# Patient Record
Sex: MALE | Race: WHITE | NOT HISPANIC OR LATINO | URBAN - METROPOLITAN AREA
[De-identification: names, ages, dates, MRNs, and addresses within clinical notes are randomized per-mention and may not be internally consistent; named-entity substitution may affect disease eponyms.]

---

## 2023-01-29 ENCOUNTER — WEB ENCOUNTER (OUTPATIENT)
Dept: URBAN - METROPOLITAN AREA CLINIC 128 | Facility: CLINIC | Age: 58
End: 2023-01-29

## 2023-02-01 ENCOUNTER — TELEPHONE ENCOUNTER (OUTPATIENT)
Dept: URBAN - METROPOLITAN AREA CLINIC 128 | Facility: CLINIC | Age: 58
End: 2023-02-01

## 2023-02-01 ENCOUNTER — OFFICE VISIT (OUTPATIENT)
Dept: URBAN - METROPOLITAN AREA CLINIC 128 | Facility: CLINIC | Age: 58
End: 2023-02-01
Payer: COMMERCIAL

## 2023-02-01 VITALS
TEMPERATURE: 98.4 F | SYSTOLIC BLOOD PRESSURE: 160 MMHG | HEART RATE: 83 BPM | DIASTOLIC BLOOD PRESSURE: 98 MMHG | BODY MASS INDEX: 28.71 KG/M2 | HEIGHT: 72 IN | WEIGHT: 212 LBS

## 2023-02-01 DIAGNOSIS — R10.9 ABDOMINAL PAIN: ICD-10-CM

## 2023-02-01 DIAGNOSIS — D12.6 TUBULAR ADENOMA OF COLON: ICD-10-CM

## 2023-02-01 DIAGNOSIS — Z83.71 FAMILY HISTORY OF COLONIC POLYPS: ICD-10-CM

## 2023-02-01 DIAGNOSIS — K29.50 CHRONIC GASTRITIS WITHOUT BLEEDING, UNSPECIFIED GASTRITIS TYPE: ICD-10-CM

## 2023-02-01 DIAGNOSIS — K22.70 BARRETT'S ESOPHAGUS WITHOUT DYSPLASIA: ICD-10-CM

## 2023-02-01 PROCEDURE — 99204 OFFICE O/P NEW MOD 45 MIN: CPT | Performed by: PHYSICIAN ASSISTANT

## 2023-02-01 RX ORDER — HYOSCYAMINE SULFATE 0.12 MG/1
1 TABLET UNDER THE TONGUE AND ALLOW TO DISSOLVE  AS NEEDED TABLET, ORALLY DISINTEGRATING ORAL THREE TIMES A DAY
Qty: 30 | Refills: 0 | OUTPATIENT
Start: 2023-02-01 | End: 2023-03-03

## 2023-02-01 RX ORDER — FAMOTIDINE 20 MG/1
1 TABLET AT BEDTIME TABLET, FILM COATED ORAL ONCE A DAY
Qty: 30 TABLET | Refills: 5 | OUTPATIENT
Start: 2023-02-01

## 2023-02-01 NOTE — HPI-TODAY'S VISIT:
The patient is a new patientt  who elicits having abdominal pain. Location: RLQ, the patient believes it is a right "inguinal hernia" Duration of symptoms:x 1 year Associated symptoms: none Severity/ Pain scale: mild What alleviates the symptoms: resting What aggravates the symptoms: heavy lifting Any recent weight changes: none Any recent medication changes: none Any recent dietary changes: none Previous work-up- labs,imaging, scopes: Last colonoscopy: 2018 Last EGD:  never He has known GERD but has not been taking any antacids and is diet controlled Patient denies a family history of colon cancer His father had colon polyps

## 2023-02-01 NOTE — PHYSICAL EXAM GASTROINTESTINAL
Abdomen , soft, nontender, nondistended , no guarding or rigidity , no masses palpable , normal bowel sounds, negative Paige's sign, negative psoas and obturator signs, negative CVA tenderness bilaterally, a right inguinal hernia was palpated Liver and Spleen , no hepatosplenomegaly Rectal deferred

## 2023-02-03 LAB
A/G RATIO: 1.1
ABSOLUTE BASOPHILS: 82
ABSOLUTE EOSINOPHILS: 7
ABSOLUTE LYMPHOCYTES: 632
ABSOLUTE MONOCYTES: 469
ABSOLUTE NEUTROPHILS: 5610
ALBUMIN: 3.5
ALKALINE PHOSPHATASE: 195
ALT (SGPT): 106
AST (SGOT): 180
BASOPHILS: 1.2
BILIRUBIN, TOTAL: 1.9
BUN/CREATININE RATIO: (no result)
BUN: 8
C-REACTIVE PROTEIN, QUANT: 3.1
CALCIUM: 8.2
CARBON DIOXIDE, TOTAL: 27
CHLORIDE: 101
CREATININE: 0.88
EGFR: 102
EOSINOPHILS: 0.1
GLOBULIN, TOTAL: 3.1
GLUCOSE: 106
HEMATOCRIT: 42.4
HEMOGLOBIN: 15.2
IMMUNOGLOBULIN A: 426
INTERPRETATION: (no result)
LIPASE: 13
LYMPHOCYTES: 9.3
MCH: 33.9
MCHC: 35.8
MCV: 94.6
MONOCYTES: 6.9
MPV: 11.5
NEUTROPHILS: 82.5
PLATELET COUNT: 190
POTASSIUM: 3.3
PROTEIN, TOTAL: 6.6
RDW: 14.6
RED BLOOD CELL COUNT: 4.48
SODIUM: 142
TISSUE TRANSGLUTAMINASE AB, IGA: <1
WHITE BLOOD CELL COUNT: 6.8

## 2023-02-22 ENCOUNTER — OFFICE VISIT (OUTPATIENT)
Dept: URBAN - METROPOLITAN AREA CLINIC 128 | Facility: CLINIC | Age: 58
End: 2023-02-22
Payer: COMMERCIAL

## 2023-02-22 VITALS
SYSTOLIC BLOOD PRESSURE: 132 MMHG | DIASTOLIC BLOOD PRESSURE: 95 MMHG | TEMPERATURE: 97.1 F | BODY MASS INDEX: 28.58 KG/M2 | WEIGHT: 211 LBS | HEART RATE: 91 BPM | HEIGHT: 72 IN

## 2023-02-22 DIAGNOSIS — R16.1 SPLENOMEGALY: ICD-10-CM

## 2023-02-22 DIAGNOSIS — K76.0 FATTY LIVER: ICD-10-CM

## 2023-02-22 DIAGNOSIS — R10.9 ABDOMINAL PAIN: ICD-10-CM

## 2023-02-22 DIAGNOSIS — R79.89 ELEVATED LFTS: ICD-10-CM

## 2023-02-22 DIAGNOSIS — K40.90 INGUINAL HERNIA WITHOUT OBSTRUCTION OR GANGRENE, RECURRENCE NOT SPECIFIED, UNSPECIFIED LATERALITY: ICD-10-CM

## 2023-02-22 DIAGNOSIS — K76.6 PORTAL HYPERTENSION: ICD-10-CM

## 2023-02-22 DIAGNOSIS — I85.00 ESOPHAGEAL VARICES WITHOUT BLEEDING, UNSPECIFIED ESOPHAGEAL VARICES TYPE: ICD-10-CM

## 2023-02-22 DIAGNOSIS — D12.6 TUBULAR ADENOMA OF COLON: ICD-10-CM

## 2023-02-22 DIAGNOSIS — K29.50 CHRONIC GASTRITIS WITHOUT BLEEDING, UNSPECIFIED GASTRITIS TYPE: ICD-10-CM

## 2023-02-22 DIAGNOSIS — Z83.71 FAMILY HISTORY OF COLONIC POLYPS: ICD-10-CM

## 2023-02-22 DIAGNOSIS — K22.70 BARRETT'S ESOPHAGUS WITHOUT DYSPLASIA: ICD-10-CM

## 2023-02-22 PROBLEM — 302914006: Status: ACTIVE | Noted: 2023-02-01

## 2023-02-22 PROBLEM — 34742003: Status: ACTIVE | Noted: 2023-02-22

## 2023-02-22 PROBLEM — 197321007: Status: ACTIVE | Noted: 2023-02-22

## 2023-02-22 PROBLEM — 8493009: Status: ACTIVE | Noted: 2023-02-01

## 2023-02-22 PROBLEM — 14223005: Status: ACTIVE | Noted: 2023-02-22

## 2023-02-22 PROCEDURE — 99215 OFFICE O/P EST HI 40 MIN: CPT | Performed by: PHYSICIAN ASSISTANT

## 2023-02-22 RX ORDER — FAMOTIDINE 20 MG/1
1 TABLET AT BEDTIME TABLET, FILM COATED ORAL ONCE A DAY
Qty: 30 TABLET | Refills: 5 | Status: ACTIVE | COMMUNITY
Start: 2023-02-01

## 2023-02-22 RX ORDER — HYOSCYAMINE SULFATE 0.12 MG/1
1 TABLET UNDER THE TONGUE AND ALLOW TO DISSOLVE  AS NEEDED TABLET, ORALLY DISINTEGRATING ORAL THREE TIMES A DAY
Qty: 30 | Refills: 0 | Status: ON HOLD | COMMUNITY
Start: 2023-02-01 | End: 2023-03-03

## 2023-02-22 RX ORDER — FAMOTIDINE 20 MG/1
1 TABLET AT BEDTIME TABLET, FILM COATED ORAL ONCE A DAY
Qty: 30 TABLET | Refills: 5 | OUTPATIENT

## 2023-02-22 NOTE — HPI-TODAY'S VISIT:
The patient is here for a follow up on abdominal pain. Location: RLQ, the patient believes it is a right "inguinal hernia" Duration of symptoms:x 1 year Associated symptoms: none Severity/ Pain scale: mild What alleviates the symptoms: resting What aggravates the symptoms: heavy lifting Any recent weight changes: none Any recent medication changes: none Any recent dietary changes: none Previous work-up- labs,imaging, scopes: Last colonoscopy: 2018 Last EGD:  never He has known GERD but has not been taking any antacids and is diet controlled Patient denies a family history of colon cancer His father had colon polyps His recent labs revealed a total bili 1.9, alk phos 195, .  He was drinking 4 drinks of beer a day but once he got his lab results, he has stopped all ETOH use. [facility] ct scan revealed no acute pricess, small fat containing right inguinal hernia, fatty liver, portal hypertension with small varices that are perisplenic and paraesophageal, splenomgealy, and diverticulosis.  His hopsital ct scan of abdomen and pelvis confirmed a right inguinal hernia and Dr. Todd will be repairing it in 3/3/23.

## 2023-03-02 ENCOUNTER — OFFICE VISIT (OUTPATIENT)
Dept: URBAN - METROPOLITAN AREA SURGERY CENTER 31 | Facility: SURGERY CENTER | Age: 58
End: 2023-03-02

## 2023-03-27 ENCOUNTER — TELEPHONE ENCOUNTER (OUTPATIENT)
Dept: URBAN - METROPOLITAN AREA CLINIC 128 | Facility: CLINIC | Age: 58
End: 2023-03-27

## 2023-03-31 PROBLEM — 429969003: Status: ACTIVE | Noted: 2023-03-31

## 2023-03-31 PROBLEM — 444898006: Status: ACTIVE | Noted: 2023-03-31

## 2023-04-14 ENCOUNTER — OFFICE VISIT (OUTPATIENT)
Dept: URBAN - METROPOLITAN AREA MEDICAL CENTER 18 | Facility: MEDICAL CENTER | Age: 58
End: 2023-04-14
Payer: COMMERCIAL

## 2023-04-14 DIAGNOSIS — K20.80 ESOPHAGITIS DISSECANS SUPERFICIALIS: ICD-10-CM

## 2023-04-14 DIAGNOSIS — R93.3 ABN FINDINGS-GI TRACT: ICD-10-CM

## 2023-04-14 DIAGNOSIS — K31.89 ACQUIRED DEFORMITY OF DUODENUM: ICD-10-CM

## 2023-04-14 PROCEDURE — 43239 EGD BIOPSY SINGLE/MULTIPLE: CPT | Performed by: INTERNAL MEDICINE

## 2023-04-18 ENCOUNTER — P2P PATIENT RECORD (OUTPATIENT)
Age: 58
End: 2023-04-18

## 2023-04-26 ENCOUNTER — OFFICE VISIT (OUTPATIENT)
Dept: URBAN - METROPOLITAN AREA SURGERY CENTER 31 | Facility: SURGERY CENTER | Age: 58
End: 2023-04-26
Payer: COMMERCIAL

## 2023-04-26 DIAGNOSIS — D12.4 ADENOMA OF DESCENDING COLON: ICD-10-CM

## 2023-04-26 DIAGNOSIS — D12.8 ADENOMATOUS POLYP OF RECTUM: ICD-10-CM

## 2023-04-26 DIAGNOSIS — Z86.010 ADENOMAS PERSONAL HISTORY OF COLONIC POLYPS: ICD-10-CM

## 2023-04-26 PROCEDURE — 45385 COLONOSCOPY W/LESION REMOVAL: CPT | Performed by: INTERNAL MEDICINE

## 2023-04-26 PROCEDURE — G8907 PT DOC NO EVENTS ON DISCHARG: HCPCS | Performed by: INTERNAL MEDICINE

## 2023-05-23 LAB
A/G RATIO: 1.2
ACTIN (SMOOTH MUSCLE) ANTIBODY (IGG): 25
AFP, SERUM, TUMOR MARKER: 5
ALBUMIN: 4.1
ALKALINE PHOSPHATASE: 101
ALKALINE PHOSPHATASE: 116
ALPHA-1-ANTITRYPSIN QN: 131
ALT (SGPT): 24
ANA SCREEN, IFA: NEGATIVE
AST (SGOT): 46
BILIRUBIN, TOTAL: 0.8
BONE ISOENZYMES: 23
BUN/CREATININE RATIO: (no result)
BUN: 14
CALCIUM: 9.5
CARBON DIOXIDE, TOTAL: 25
CERULOPLASMIN: 25
CHLORIDE: 106
CREATININE: 1.03
EGFR: 86
FERRITIN, SERUM: 114
GLOBULIN, TOTAL: 3.5
GLUCOSE: 95
HBSAG SCREEN: (no result)
HEP A AB, IGM: (no result)
HEP B CORE AB, IGM: (no result)
HEP C VIRUS AB: 0.1
HEPATITIS C ANTIBODY: (no result)
INR: 1.1
INTESTINAL ISOENZYMES: 7
IRON BIND.CAP.(TIBC): 397
IRON SATURATION: 28
IRON: 111
LIVER ISOENZYMES: 69
MACROHEPATIC ISOENZYMES: 0
MITOCHONDRIAL (M2) ANTIBODY: <=20
PLACENTAL ISOENZYMES: 0
POTASSIUM: 4.4
PROTEIN, TOTAL: 7.6
PT: 11.4
SODIUM: 140

## 2023-05-24 ENCOUNTER — DASHBOARD ENCOUNTERS (OUTPATIENT)
Age: 58
End: 2023-05-24

## 2023-05-24 ENCOUNTER — TELEPHONE ENCOUNTER (OUTPATIENT)
Dept: URBAN - METROPOLITAN AREA CLINIC 19 | Facility: CLINIC | Age: 58
End: 2023-05-24

## 2023-05-24 ENCOUNTER — OFFICE VISIT (OUTPATIENT)
Dept: URBAN - METROPOLITAN AREA CLINIC 128 | Facility: CLINIC | Age: 58
End: 2023-05-24
Payer: COMMERCIAL

## 2023-05-24 ENCOUNTER — WEB ENCOUNTER (OUTPATIENT)
Dept: URBAN - METROPOLITAN AREA CLINIC 128 | Facility: CLINIC | Age: 58
End: 2023-05-24

## 2023-05-24 VITALS
BODY MASS INDEX: 32.43 KG/M2 | HEART RATE: 72 BPM | HEIGHT: 72 IN | DIASTOLIC BLOOD PRESSURE: 68 MMHG | SYSTOLIC BLOOD PRESSURE: 120 MMHG | TEMPERATURE: 97.3 F | WEIGHT: 239.4 LBS

## 2023-05-24 DIAGNOSIS — R16.1 SPLENOMEGALY: ICD-10-CM

## 2023-05-24 DIAGNOSIS — K40.90 INGUINAL HERNIA WITHOUT OBSTRUCTION OR GANGRENE, RECURRENCE NOT SPECIFIED, UNSPECIFIED LATERALITY: ICD-10-CM

## 2023-05-24 DIAGNOSIS — R10.9 ABDOMINAL PAIN: ICD-10-CM

## 2023-05-24 DIAGNOSIS — K76.0 FATTY LIVER: ICD-10-CM

## 2023-05-24 DIAGNOSIS — K76.6 PORTAL HYPERTENSION: ICD-10-CM

## 2023-05-24 DIAGNOSIS — K29.50 CHRONIC GASTRITIS WITHOUT BLEEDING, UNSPECIFIED GASTRITIS TYPE: ICD-10-CM

## 2023-05-24 DIAGNOSIS — D12.6 TUBULAR ADENOMA OF COLON: ICD-10-CM

## 2023-05-24 DIAGNOSIS — K22.70 BARRETT'S ESOPHAGUS WITHOUT DYSPLASIA: ICD-10-CM

## 2023-05-24 DIAGNOSIS — R79.89 ELEVATED LFTS: ICD-10-CM

## 2023-05-24 DIAGNOSIS — K20.90 ESOPHAGITIS: ICD-10-CM

## 2023-05-24 DIAGNOSIS — Z83.71 FAMILY HISTORY OF COLONIC POLYPS: ICD-10-CM

## 2023-05-24 DIAGNOSIS — I85.00 ESOPHAGEAL VARICES WITHOUT BLEEDING, UNSPECIFIED ESOPHAGEAL VARICES TYPE: ICD-10-CM

## 2023-05-24 PROCEDURE — 99214 OFFICE O/P EST MOD 30 MIN: CPT | Performed by: PHYSICIAN ASSISTANT

## 2023-05-24 RX ORDER — FAMOTIDINE 20 MG/1
1 TABLET AT BEDTIME TABLET, FILM COATED ORAL ONCE A DAY
Qty: 30 TABLET | Refills: 5 | OUTPATIENT

## 2023-05-24 RX ORDER — FAMOTIDINE 20 MG/1
1 TABLET AT BEDTIME TABLET, FILM COATED ORAL ONCE A DAY
Qty: 30 TABLET | Refills: 5 | Status: ACTIVE | COMMUNITY

## 2023-05-24 NOTE — HPI-TODAY'S VISIT:
The patient is here for a follow up visit. His RLQ abdominal pain has resolved. Right inguinal hernia repair was successful and done with Dr. Todd Previous work-up- labs,imaging, scopes: Prior colonoscopy: 2018 Has known GERD but has not been taking any antacids and is diet controlled Patient denies a family history of colon cancer His father had colon polyps His 02/2023 labs revealed a total bili 1.9, alk phos 195, .  His current labs 5/23/23 revealed a total inessa of 0.8, alk phos 116, AST 46, ALT 24. He had an elevated ASMA at 27. He was drinking 4 drinks of beer a day but once he got his lab results, he has stopped all ETOH use. His hospital ct scan revealed no acute pricess, small fat containing right inguinal hernia, fatty liver, portal hypertension with small varices that are perisplenic and paraesophageal, splenomgealy, and diverticulosis.  His hopsital ct scan of abdomen and pelvis confirmed a right inguinal hernia and Dr. Todd repaired it on 3/3/23. 2023 colonoscopy revealed tubular adenomas and he is to repeat it in 5 years 2023 EGD revealed no esophageal varices, esophagitis was noted, no Barretts esophagus was noted

## 2023-05-24 NOTE — PHYSICAL EXAM GASTROINTESTINAL
Abdomen , soft, nontender, nondistended , no guarding or rigidity , no masses palpable , normal bowel sounds, negative Paige's sign, negative psoas and obturator signs, negative CVA tenderness bilaterally Liver and Spleen , no hepatosplenomegaly Rectal deferred

## 2023-08-02 ENCOUNTER — OFFICE VISIT (OUTPATIENT)
Dept: URBAN - METROPOLITAN AREA CLINIC 128 | Facility: CLINIC | Age: 58
End: 2023-08-02